# Patient Record
Sex: MALE | Race: WHITE | NOT HISPANIC OR LATINO | URBAN - METROPOLITAN AREA
[De-identification: names, ages, dates, MRNs, and addresses within clinical notes are randomized per-mention and may not be internally consistent; named-entity substitution may affect disease eponyms.]

---

## 2020-04-30 ENCOUNTER — TELEPHONE (OUTPATIENT)
Dept: UROLOGY | Facility: CLINIC | Age: 70
End: 2020-04-30

## 2020-04-30 NOTE — TELEPHONE ENCOUNTER
Contacted [facility] regarding canceling appt for pt wanting to see a different provider. Canceled appt. BJP      ----- Message from Amisha Bautista sent at 4/30/2020  2:25 PM CDT -----  Contact: ángel with [facility]  Caller is calling to cancel appt. for Raoul. He would like to see a different provider. please call back at [phone]..

## 2020-04-30 NOTE — TELEPHONE ENCOUNTER
----- Message from Jessica Pike sent at 4/30/2020  9:11 AM CDT -----  Contact: Ms Mel Mcallister [place] -[phone]  Would like to consult with the nurse, would like to know if a Fax was received  Concerning getting  The patient schedule for an Appt to be  Seen in the Office, would like to speak with the nurse concerning this, please call back at  [phone], thanks sj

## 2022-02-15 ENCOUNTER — TELEPHONE (OUTPATIENT)
Dept: GASTROENTEROLOGY | Facility: CLINIC | Age: 72
End: 2022-02-15
Payer: OTHER GOVERNMENT

## 2022-02-15 NOTE — TELEPHONE ENCOUNTER
----- Message from Drea Dunlap sent at 2/15/2022  4:06 PM CST -----  Josie bueno/ [facility]FONSO [phone] wants to know id pt has been scheduled yet please call, referral was sent on 2/7/22

## 2022-02-24 ENCOUNTER — TELEPHONE (OUTPATIENT)
Dept: GASTROENTEROLOGY | Facility: CLINIC | Age: 72
End: 2022-02-24
Payer: OTHER GOVERNMENT

## 2022-02-24 NOTE — TELEPHONE ENCOUNTER
----- Message from Georgia Carter MA sent at 2/23/2022 11:54 AM CST -----    ----- Message -----  From: Verna Taylor  Sent: 2/23/2022  11:19 AM CST  To: Efraín GIORDANO Staff    Raoul Amin is calling to get an update on [facility]. Please give him a call back for scheduling [phone] (home)     Please leave a message if he does not answer.

## 2022-07-11 DIAGNOSIS — Z12.10 ENCOUNTER FOR SCREENING FOR MALIGNANT NEOPLASM OF INTESTINAL TRACT, UNSPECIFIED: ICD-10-CM

## 2022-07-11 DIAGNOSIS — Z12.11 COLON CANCER SCREENING: Primary | ICD-10-CM

## 2022-07-19 ENCOUNTER — TELEPHONE (OUTPATIENT)
Dept: GASTROENTEROLOGY | Facility: CLINIC | Age: 72
End: 2022-07-19
Payer: OTHER GOVERNMENT